# Patient Record
Sex: FEMALE | Race: WHITE | NOT HISPANIC OR LATINO | Employment: FULL TIME | URBAN - NONMETROPOLITAN AREA
[De-identification: names, ages, dates, MRNs, and addresses within clinical notes are randomized per-mention and may not be internally consistent; named-entity substitution may affect disease eponyms.]

---

## 2018-03-28 ENCOUNTER — TRANSCRIBE ORDERS (OUTPATIENT)
Dept: ADMINISTRATIVE | Facility: HOSPITAL | Age: 53
End: 2018-03-28

## 2018-03-28 ENCOUNTER — APPOINTMENT (OUTPATIENT)
Dept: LAB | Facility: HOSPITAL | Age: 53
End: 2018-03-28

## 2018-03-28 DIAGNOSIS — N76.0 BACTERIAL VAGINOSIS: Primary | ICD-10-CM

## 2018-03-28 DIAGNOSIS — B96.89 BACTERIAL VAGINOSIS: Primary | ICD-10-CM

## 2018-03-28 PROCEDURE — 87086 URINE CULTURE/COLONY COUNT: CPT | Performed by: NURSE PRACTITIONER

## 2018-03-28 PROCEDURE — 87186 SC STD MICRODIL/AGAR DIL: CPT | Performed by: NURSE PRACTITIONER

## 2018-03-28 PROCEDURE — 87077 CULTURE AEROBIC IDENTIFY: CPT | Performed by: NURSE PRACTITIONER

## 2018-03-30 LAB
BACTERIA SPEC AEROBE CULT: ABNORMAL
BACTERIA SPEC AEROBE CULT: ABNORMAL

## 2021-10-19 ENCOUNTER — TRANSCRIBE ORDERS (OUTPATIENT)
Dept: ADMINISTRATIVE | Facility: HOSPITAL | Age: 56
End: 2021-10-19

## 2021-10-19 DIAGNOSIS — Z12.31 ENCOUNTER FOR SCREENING MAMMOGRAM FOR MALIGNANT NEOPLASM OF BREAST: Primary | ICD-10-CM

## 2021-11-05 ENCOUNTER — OFFICE VISIT (OUTPATIENT)
Dept: NEUROSURGERY | Facility: CLINIC | Age: 56
End: 2021-11-05

## 2021-11-05 VITALS
HEIGHT: 68 IN | WEIGHT: 196 LBS | SYSTOLIC BLOOD PRESSURE: 120 MMHG | DIASTOLIC BLOOD PRESSURE: 86 MMHG | TEMPERATURE: 97.3 F | BODY MASS INDEX: 29.7 KG/M2

## 2021-11-05 DIAGNOSIS — D32.9 MENINGIOMA (HCC): Primary | ICD-10-CM

## 2021-11-05 DIAGNOSIS — Z00.6 EXAM FOR CLINICAL RESEARCH: ICD-10-CM

## 2021-11-05 PROCEDURE — 99204 OFFICE O/P NEW MOD 45 MIN: CPT | Performed by: PHYSICIAN ASSISTANT

## 2021-11-05 RX ORDER — ACYCLOVIR 400 MG/1
400 TABLET ORAL
COMMUNITY

## 2021-11-05 RX ORDER — PROGESTERONE 200 MG/1
400 CAPSULE ORAL DAILY
COMMUNITY

## 2021-11-05 RX ORDER — ALBUTEROL SULFATE 90 UG/1
AEROSOL, METERED RESPIRATORY (INHALATION)
COMMUNITY
Start: 2021-10-18

## 2021-11-05 NOTE — PROGRESS NOTES
Patient: Gavi Arreguin  : 1965  Chart #: [id number]    Date of Service: 2021    CHIEF COMPLAINT: Headache    History of Present Illness Ms. Arreguin is a very pleasant 56-year-old  who is new to our clinic.  She describes an 8-9-month history of left-sided headache-worse with coughing, sneezing, and lying on the left side.  Headaches are daily, generally worse in the morning, and improved with Tylenol.  Around the time she developed headache she also developed some peripheral vision changes on the left.  She is also had some issue with tremor in the hands.  About a month ago she was diagnosed with Covid and seen in the emergency department.  Given her ongoing headache a CT of the brain was ordered.  She was found to have an incidental calcified meningioma.  She denies focal weakness or nausea/vomiting.      Past Medical History:   Diagnosis Date   • COVID-19    • Headache    • Low back pain          Current Outpatient Medications:   •  acyclovir (ZOVIRAX) 400 MG tablet, Take 400 mg by mouth Every 4 (Four) Hours While Awake. Take no more than 5 doses a day., Disp: , Rfl:   •  albuterol sulfate  (90 Base) MCG/ACT inhaler, , Disp: , Rfl:   •  Ascorbic Acid (VITAMIN C PO), Take 1,000 mg by mouth Daily., Disp: , Rfl:   •  Progesterone (PROMETRIUM) 200 MG capsule, Take 400 mg by mouth Daily., Disp: , Rfl:   •  TESTOSTERONE CYPIONATE IM, Inject 0.1 mL into the appropriate muscle as directed by prescriber 1 (One) Time Per Week., Disp: , Rfl:     No past surgical history on file.    Social History     Socioeconomic History   • Marital status: Single   Tobacco Use   • Smoking status: Never Smoker   • Smokeless tobacco: Never Used   Substance and Sexual Activity   • Alcohol use: Yes   • Drug use: Yes     Types: Marijuana   • Sexual activity: Defer         Review of Systems   Constitutional: Positive for fatigue. Negative for activity change, appetite change, chills, diaphoresis, fever  and unexpected weight change.   HENT: Positive for hearing loss. Negative for congestion, dental problem, drooling, ear discharge, ear pain, facial swelling, mouth sores, nosebleeds, postnasal drip, rhinorrhea, sinus pressure, sneezing, sore throat, tinnitus, trouble swallowing and voice change.    Eyes: Positive for visual disturbance. Negative for photophobia, pain, discharge, redness and itching.   Respiratory: Negative for apnea, cough, choking, chest tightness, shortness of breath, wheezing and stridor.    Cardiovascular: Negative for chest pain, palpitations and leg swelling.   Gastrointestinal: Negative for abdominal distention, abdominal pain, anal bleeding, blood in stool, constipation, diarrhea, nausea, rectal pain and vomiting.   Endocrine: Negative for cold intolerance, heat intolerance, polydipsia, polyphagia and polyuria.   Genitourinary: Negative for decreased urine volume, difficulty urinating, dysuria, enuresis, flank pain, frequency, genital sores, hematuria and urgency.   Musculoskeletal: Negative for arthralgias, back pain, gait problem, joint swelling, myalgias, neck pain and neck stiffness.   Skin: Negative for color change, pallor, rash and wound.   Allergic/Immunologic: Negative for environmental allergies, food allergies and immunocompromised state.   Neurological: Positive for weakness, numbness and headaches. Negative for dizziness, tremors, seizures, syncope, facial asymmetry, speech difficulty and light-headedness.   Hematological: Negative for adenopathy. Does not bruise/bleed easily.   Psychiatric/Behavioral: Negative for agitation, behavioral problems, confusion, decreased concentration, dysphoric mood, hallucinations, self-injury, sleep disturbance and suicidal ideas. The patient is not nervous/anxious and is not hyperactive.    All other systems reviewed and are negative.      Objective   Vital Signs: Blood pressure 120/86, temperature 97.3 °F (36.3 °C), temperature source Infrared,  "height 172.7 cm (68\"), weight 88.9 kg (196 lb).  Physical Exam  Vitals and nursing note reviewed.   Constitutional:       General: She is not in acute distress.     Appearance: She is well-developed.   HENT:      Head: Normocephalic and atraumatic.   Eyes:      Pupils: Pupils are equal, round, and reactive to light.   Cardiovascular:      Heart sounds: Normal heart sounds.   Pulmonary:      Breath sounds: Normal breath sounds.   Psychiatric:         Behavior: Behavior normal.         Thought Content: Thought content normal.     Musculoskeletal:     Strength is intact in upper and lower extremities to direct testing.     Station and gait are normal.     Straight leg raising is negative.   Neurologic:     Muscle tone is normal throughout.     Coordination is intact.     Deep tendon reflexes: 2+ and symmetrical.     Sensation is intact to light touch throughout.     Patient is oriented to person, place, and time.  CRANIAL NERVES:  Cranial nerve II:  Visual acuity intact.  Visual fields are intact.  Cranial nerves III, IV, and VI: Extraocular movements are intact.  There is some nystagmus on leftward gaze.  Pupils are equal, round, and reactive to light and accommodation.  The eyelids elevate normal equal bilaterally.  Cranial nerve V: Jaw clenching is intact bilaterally.  Facial sensation is intact to light touch.   Cranial nerve VII: There is no facial asymmetry to direct testing of the muscles of facial expression.  Cranial nerve VIII: Hearing intact to finger rub bilaterally.  Balance is intact.  Cranial nerve IX and X: Uvula is midline.  The soft palate rises symmetrically.  Voice is intact without hoarseness.   Cranial nerve XI: Shoulder shrug is intact.  Cranial nerve XII: Tongue is midline without atrophy or fasciculation.       Independent review of radiographic imaging: CT of the brain demonstrates a calcified meningoma noted anteriorly to the left of midline. No other acute findings.     Assessment/Plan "   Diagnosis:   1.  Calcified cerebral meningioma, likely asymptomatic  2.  Headache     Medical Decision Making: I reviewed imaging with Dr. Leonardo.  Given patient's recent onslaught of new symptoms which are unlikely to be related to the calcified meningioma demonstrated on CT scan, he would like to get an MRI of the brain to make sure we are not missing something.  If all is normal there, then we will follow-up in a couple years with a new scan.    Diagnoses and all orders for this visit:    1. Meningioma (HCC) (Primary)  -     MRI Brain With & Without Contrast; Future    2. Exam for clinical research  -     CT outside films; Future  -     CT outside films; Future  -     MRI Brain With & Without Contrast; Future                        Patient's Body mass index is 29.8 kg/m². indicating that she is overweight (BMI 25-29.9). Obesity-related health conditions include the following: none. Obesity is unchanged. BMI is is above average; BMI management plan is completed. We discussed portion control and increasing exercise..         Lynnette Chisholm PA-C  Patient Care Team:  Kristie Zelaya APRN as PCP - General (Nurse Practitioner)  Kristie Zelaya APRN as Referring Physician (Nurse Practitioner)

## 2021-12-08 ENCOUNTER — HOSPITAL ENCOUNTER (OUTPATIENT)
Dept: MRI IMAGING | Facility: HOSPITAL | Age: 56
Discharge: HOME | End: 2021-12-08
Admitting: PHYSICIAN ASSISTANT

## 2021-12-08 DIAGNOSIS — D32.9 MENINGIOMA (HCC): ICD-10-CM

## 2021-12-08 DIAGNOSIS — Z00.6 EXAM FOR CLINICAL RESEARCH: ICD-10-CM

## 2021-12-08 PROCEDURE — 70553 MRI BRAIN STEM W/O & W/DYE: CPT

## 2021-12-08 PROCEDURE — A9577 INJ MULTIHANCE: HCPCS | Performed by: PHYSICIAN ASSISTANT

## 2021-12-08 PROCEDURE — 0 GADOBENATE DIMEGLUMINE 529 MG/ML SOLUTION: Performed by: PHYSICIAN ASSISTANT

## 2021-12-08 RX ADMIN — GADOBENATE DIMEGLUMINE 18 ML: 529 INJECTION, SOLUTION INTRAVENOUS at 16:25

## 2021-12-10 ENCOUNTER — HOSPITAL ENCOUNTER (OUTPATIENT)
Dept: MAMMOGRAPHY | Facility: HOSPITAL | Age: 56
Discharge: HOME | End: 2021-12-10
Admitting: NURSE PRACTITIONER

## 2021-12-10 DIAGNOSIS — Z12.31 ENCOUNTER FOR SCREENING MAMMOGRAM FOR MALIGNANT NEOPLASM OF BREAST: ICD-10-CM

## 2021-12-10 PROCEDURE — 77067 SCR MAMMO BI INCL CAD: CPT

## 2021-12-10 PROCEDURE — 77063 BREAST TOMOSYNTHESIS BI: CPT

## 2021-12-15 ENCOUNTER — OFFICE VISIT (OUTPATIENT)
Dept: NEUROSURGERY | Facility: CLINIC | Age: 56
End: 2021-12-15

## 2021-12-15 VITALS
BODY MASS INDEX: 29.95 KG/M2 | TEMPERATURE: 98 F | SYSTOLIC BLOOD PRESSURE: 122 MMHG | WEIGHT: 197.6 LBS | HEIGHT: 68 IN | DIASTOLIC BLOOD PRESSURE: 82 MMHG

## 2021-12-15 DIAGNOSIS — D32.9 MENINGIOMA (HCC): Primary | ICD-10-CM

## 2021-12-15 PROCEDURE — 99213 OFFICE O/P EST LOW 20 MIN: CPT | Performed by: PHYSICIAN ASSISTANT

## 2021-12-15 RX ORDER — ESTRADIOL 0.5 MG/1
TABLET ORAL
COMMUNITY
Start: 2021-11-15

## 2021-12-15 NOTE — PROGRESS NOTES
Patient: Gavi Arreguin  : 1965  Chart #: [id number]    Date of Service: 12/15/2021    CHIEF COMPLAINT: Headache    History of Present Illness Ms. Arreguin is seen in follow-up.  She is a very pleasant 56-year-old  who is new to our clinic.  She describes an 8-9-month history of left-sided headache-worse with coughing, sneezing, and lying on the left side.  Headaches are daily, generally worse in the morning, and improved with Tylenol.  Around the time she developed headache she also developed some peripheral vision changes on the left.  She is also had some issue with tremor in the hands.  About a month ago she was diagnosed with Covid and seen in the emergency department.  Given her ongoing headache a CT of the brain was ordered.  She was found to have an incidental calcified meningioma that was reviewed at last visit.  Dr. Leonardo recommended follow-up with an MRI with and without gadolinium.  She is here today to review.  She denies focal weakness or nausea/vomiting.  No new symptoms      Past Medical History:   Diagnosis Date   • COVID-19    • Headache    • Low back pain          Current Outpatient Medications:   •  acyclovir (ZOVIRAX) 400 MG tablet, Take 400 mg by mouth Every 4 (Four) Hours While Awake. Take no more than 5 doses a day., Disp: , Rfl:   •  albuterol sulfate  (90 Base) MCG/ACT inhaler, , Disp: , Rfl:   •  Ascorbic Acid (VITAMIN C PO), Take 1,000 mg by mouth Daily., Disp: , Rfl:   •  estradiol (ESTRACE) 0.5 MG tablet, , Disp: , Rfl:   •  Progesterone (PROMETRIUM) 200 MG capsule, Take 400 mg by mouth Daily., Disp: , Rfl:   •  TESTOSTERONE CYPIONATE IM, Inject 0.1 mL into the appropriate muscle as directed by prescriber 1 (One) Time Per Week., Disp: , Rfl:     No past surgical history on file.    Social History     Socioeconomic History   • Marital status: Single   Tobacco Use   • Smoking status: Never Smoker   • Smokeless tobacco: Never Used   Substance and Sexual  Activity   • Alcohol use: Yes   • Drug use: Yes     Types: Marijuana   • Sexual activity: Defer         Review of Systems   Constitutional: Positive for fatigue. Negative for activity change, appetite change, chills, diaphoresis, fever and unexpected weight change.   HENT: Positive for hearing loss. Negative for congestion, dental problem, drooling, ear discharge, ear pain, facial swelling, mouth sores, nosebleeds, postnasal drip, rhinorrhea, sinus pressure, sneezing, sore throat, tinnitus, trouble swallowing and voice change.    Eyes: Positive for visual disturbance. Negative for photophobia, pain, discharge, redness and itching.   Respiratory: Negative for apnea, cough, choking, chest tightness, shortness of breath, wheezing and stridor.    Cardiovascular: Negative for chest pain, palpitations and leg swelling.   Gastrointestinal: Negative for abdominal distention, abdominal pain, anal bleeding, blood in stool, constipation, diarrhea, nausea, rectal pain and vomiting.   Endocrine: Negative for cold intolerance, heat intolerance, polydipsia, polyphagia and polyuria.   Genitourinary: Negative for decreased urine volume, difficulty urinating, dysuria, enuresis, flank pain, frequency, genital sores, hematuria and urgency.   Musculoskeletal: Negative for arthralgias, back pain, gait problem, joint swelling, myalgias, neck pain and neck stiffness.   Skin: Negative for color change, pallor, rash and wound.   Allergic/Immunologic: Negative for environmental allergies, food allergies and immunocompromised state.   Neurological: Positive for weakness, numbness and headaches. Negative for dizziness, tremors, seizures, syncope, facial asymmetry, speech difficulty and light-headedness.   Hematological: Negative for adenopathy. Does not bruise/bleed easily.   Psychiatric/Behavioral: Negative for agitation, behavioral problems, confusion, decreased concentration, dysphoric mood, hallucinations, self-injury, sleep disturbance and  "suicidal ideas. The patient is not nervous/anxious and is not hyperactive.    All other systems reviewed and are negative.      Objective   Vital Signs: Blood pressure 122/82, temperature 98 °F (36.7 °C), temperature source Infrared, height 172.7 cm (68\"), weight 89.6 kg (197 lb 9.6 oz).  Physical Exam  Vitals and nursing note reviewed.   Constitutional:       General: She is not in acute distress.     Appearance: She is well-developed.   HENT:      Head: Normocephalic and atraumatic.   Eyes:      Pupils: Pupils are equal, round, and reactive to light.   Cardiovascular:      Heart sounds: Normal heart sounds.   Pulmonary:      Breath sounds: Normal breath sounds.   Psychiatric:         Behavior: Behavior normal.         Thought Content: Thought content normal.     Musculoskeletal:     Strength is intact in upper and lower extremities to direct testing.     Station and gait are normal.     Straight leg raising is negative.   Neurologic:     Muscle tone is normal throughout.     Coordination is intact.     Deep tendon reflexes: 2+ and symmetrical.     Sensation is intact to light touch throughout.     Patient is oriented to person, place, and time.  CRANIAL NERVES:  Cranial nerve II:  Visual acuity intact.  Visual fields are intact.  Cranial nerves III, IV, and VI: Extraocular movements are intact.  There is some nystagmus on leftward gaze.  Pupils are equal, round, and reactive to light and accommodation.  The eyelids elevate normal equal bilaterally.  Cranial nerve V: Jaw clenching is intact bilaterally.  Facial sensation is intact to light touch.   Cranial nerve VII: There is no facial asymmetry to direct testing of the muscles of facial expression.  Cranial nerve VIII: Hearing intact to finger rub bilaterally.  Balance is intact.  Cranial nerve IX and X: Uvula is midline.  The soft palate rises symmetrically.  Voice is intact without hoarseness.   Cranial nerve XI: Shoulder shrug is intact.  Cranial nerve XII: " Tongue is midline without atrophy or fasciculation.       Independent review of radiographic imaging: CT of the brain demonstrates a calcified meningoma noted anteriorly to the left of midline. No other acute findings.     MRI of the brain dated 12/9/2021 demonstrates 1.5 cm extra-axial homogenously enhancing mass in the left vertex without significant surrounding edema or mass-effect.  Consistent with previously noted calcified meningioma.  MRI was also reviewed by Dr. Leonardo    Assessment/Plan   Diagnosis:   1.  Calcified cerebral meningioma, likely asymptomatic  2.  Headache     Medical Decision Making: Patient will follow up in 1 year with an MRI of the brain with and without gadolinium.  Call our office in the interim with any questions or concerns      Diagnoses and all orders for this visit:    1. Meningioma (HCC) (Primary)  -     MRI Brain With & Without Contrast; Future                        Patient's Body mass index is 30.04 kg/m². indicating that she is overweight (BMI 25-29.9). Obesity-related health conditions include the following: none. Obesity is unchanged. BMI is is above average; BMI management plan is completed. We discussed portion control and increasing exercise..         Lynnette Chisholm PA-C  Patient Care Team:  Kristie Zelaya APRN as PCP - General (Nurse Practitioner)  Kristie Zelaya APRN as Referring Physician (Nurse Practitioner)

## 2021-12-21 ENCOUNTER — TRANSCRIBE ORDERS (OUTPATIENT)
Dept: ADMINISTRATIVE | Facility: HOSPITAL | Age: 56
End: 2021-12-21

## 2021-12-21 DIAGNOSIS — R92.8 ABNORMAL MAMMOGRAM: Primary | ICD-10-CM

## 2021-12-23 ENCOUNTER — TRANSCRIBE ORDERS (OUTPATIENT)
Dept: ADMINISTRATIVE | Facility: HOSPITAL | Age: 56
End: 2021-12-23

## 2021-12-23 DIAGNOSIS — R92.8 ABNORMAL MAMMOGRAM: Primary | ICD-10-CM

## 2022-01-28 ENCOUNTER — HOSPITAL ENCOUNTER (OUTPATIENT)
Dept: ULTRASOUND IMAGING | Facility: HOSPITAL | Age: 57
Discharge: HOME | End: 2022-01-28

## 2022-01-28 ENCOUNTER — HOSPITAL ENCOUNTER (OUTPATIENT)
Dept: MAMMOGRAPHY | Facility: HOSPITAL | Age: 57
Discharge: HOME | End: 2022-01-28

## 2022-01-28 DIAGNOSIS — R92.8 ABNORMAL MAMMOGRAM: ICD-10-CM

## 2022-01-28 PROCEDURE — G0279 TOMOSYNTHESIS, MAMMO: HCPCS

## 2022-01-28 PROCEDURE — 76642 ULTRASOUND BREAST LIMITED: CPT

## 2022-01-28 PROCEDURE — 77065 DX MAMMO INCL CAD UNI: CPT

## 2022-02-11 ENCOUNTER — TRANSCRIBE ORDERS (OUTPATIENT)
Dept: ADMINISTRATIVE | Facility: HOSPITAL | Age: 57
End: 2022-02-11

## 2022-02-11 DIAGNOSIS — R92.8 ABNORMAL MAMMOGRAM OF RIGHT BREAST: Primary | ICD-10-CM

## 2022-06-27 ENCOUNTER — HOSPITAL ENCOUNTER (OUTPATIENT)
Dept: MAMMOGRAPHY | Facility: HOSPITAL | Age: 57
Discharge: HOME | End: 2022-06-27

## 2022-06-27 ENCOUNTER — HOSPITAL ENCOUNTER (OUTPATIENT)
Dept: ULTRASOUND IMAGING | Facility: HOSPITAL | Age: 57
Discharge: HOME | End: 2022-06-27

## 2022-06-27 DIAGNOSIS — R92.8 ABNORMAL MAMMOGRAM OF RIGHT BREAST: ICD-10-CM

## 2022-06-27 PROCEDURE — 76641 ULTRASOUND BREAST COMPLETE: CPT

## 2022-06-27 PROCEDURE — G0279 TOMOSYNTHESIS, MAMMO: HCPCS

## 2022-06-27 PROCEDURE — 77065 DX MAMMO INCL CAD UNI: CPT

## 2023-05-24 ENCOUNTER — HOSPITAL ENCOUNTER (OUTPATIENT)
Dept: GENERAL RADIOLOGY | Facility: HOSPITAL | Age: 58
Discharge: HOME | End: 2023-05-24
Admitting: NURSE PRACTITIONER
Payer: COMMERCIAL

## 2023-05-24 ENCOUNTER — TRANSCRIBE ORDERS (OUTPATIENT)
Dept: GENERAL RADIOLOGY | Facility: HOSPITAL | Age: 58
End: 2023-05-24
Payer: COMMERCIAL

## 2023-05-24 DIAGNOSIS — M79.674 PAIN IN TOE OF RIGHT FOOT: Primary | ICD-10-CM

## 2023-05-24 DIAGNOSIS — M79.674 PAIN IN TOE OF RIGHT FOOT: ICD-10-CM

## 2023-05-24 PROCEDURE — 73660 X-RAY EXAM OF TOE(S): CPT

## 2023-11-30 ENCOUNTER — TELEPHONE (OUTPATIENT)
Dept: NEUROSURGERY | Facility: CLINIC | Age: 58
End: 2023-11-30
Payer: COMMERCIAL

## 2023-11-30 DIAGNOSIS — D32.9 MENINGIOMA: Primary | ICD-10-CM

## 2023-11-30 NOTE — TELEPHONE ENCOUNTER
"----- Message from Aly Coats MA sent at 11/29/2023 12:06 PM EST -----  Regarding: FW: Appointment Request  Contact: [phone]    ----- Message -----  From: Rosa Elaine  Sent: 11/28/2023   5:03 PM EST  To: [facility]  Subject: FW: Appointment Request                          Should patient be scheduled for an MRI or CT prior to appt?      Per last office note, \" I reviewed imaging with Dr. Leonardo.  Given patient's recent onslaught of new symptoms which are unlikely to be related to the calcified meningioma demonstrated on CT scan, he would like to get an MRI of the brain to make sure we are not missing something.  If all is normal there, then we will follow-up in a couple years with a new scan.\"    Please review and advise.      Thank you.       ----- Message -----  From: Gavi Arreguin \"Alisa\"  Sent: 11/27/2023   9:22 AM EST  To: [facility]  Subject: Appointment Request                              Appointment Request From: Gavi Arreguin    With Provider: Lynnette Chisholm [[facility] NEUROSURGERY]    Preferred Date Range: 12/8/2023 – 12/15/2023    Preferred Times: Any Time    Reason for visit: Follow-up    Comments:  if I need to repeat MRI.  still having vision issues, still having headaches, also some numbness on the left side of face/head.       "

## 2023-11-30 NOTE — TELEPHONE ENCOUNTER
"Per Apro's last note on 12/15/2021    \"Patient will follow up in 1 year with an MRI of the brain with and without gadolinium.  Call our office in the interim with any questions or concerns\"      Ok to schedule MRI and f/u with A[facility]. Order placed.    "

## 2024-01-09 ENCOUNTER — HOSPITAL ENCOUNTER (OUTPATIENT)
Dept: MRI IMAGING | Facility: HOSPITAL | Age: 59
Discharge: HOME | End: 2024-01-09
Admitting: PHYSICIAN ASSISTANT
Payer: COMMERCIAL

## 2024-01-09 DIAGNOSIS — D32.9 MENINGIOMA: ICD-10-CM

## 2024-01-09 PROCEDURE — A9577 INJ MULTIHANCE: HCPCS | Performed by: PHYSICIAN ASSISTANT

## 2024-01-09 PROCEDURE — 70553 MRI BRAIN STEM W/O & W/DYE: CPT

## 2024-01-09 PROCEDURE — 0 GADOBENATE DIMEGLUMINE 529 MG/ML SOLUTION: Performed by: PHYSICIAN ASSISTANT

## 2024-01-09 RX ADMIN — GADOBENATE DIMEGLUMINE 19 ML: 529 INJECTION, SOLUTION INTRAVENOUS at 14:23

## 2024-01-10 ENCOUNTER — OFFICE VISIT (OUTPATIENT)
Dept: NEUROSURGERY | Facility: CLINIC | Age: 59
End: 2024-01-10
Payer: COMMERCIAL

## 2024-01-10 VITALS — WEIGHT: 216.8 LBS | BODY MASS INDEX: 32.86 KG/M2 | TEMPERATURE: 97.7 F | HEIGHT: 68 IN | RESPIRATION RATE: 17 BRPM

## 2024-01-10 DIAGNOSIS — D32.9 MENINGIOMA: Primary | ICD-10-CM

## 2024-01-10 PROCEDURE — 99214 OFFICE O/P EST MOD 30 MIN: CPT | Performed by: NEUROLOGICAL SURGERY

## 2024-01-10 RX ORDER — AMOXICILLIN AND CLAVULANATE POTASSIUM 875; 125 MG/1; MG/1
TABLET, FILM COATED ORAL
COMMUNITY
Start: 2024-01-04

## 2024-01-10 NOTE — PROGRESS NOTES
Patient: Gavi Arreguin  : 1965    Primary Care Provider: Kristie Zelaya APRN    Requesting Provider: As above        History    Chief Complaint:.  Headache.    History of Present Illness: Ms. Arreguin is a 58-year-old woman who has been seen in our clinic on a couple of occasions.  Given the above difficulties she underwent imaging and imaging demonstrated a small parasagittal left frontal lesion suspicious for calcified meningioma.  She continues to have some low-grade left-sided headache.  She has a bit of an odd feeling in her face on the left side.  She has previously had Bell's palsy on that side.  She describes a bit of a visual shadow on the left.  Ophthalmologic evaluation has been unremarkable by her report.    Review of Systems   Constitutional:  Negative for activity change, appetite change, chills, diaphoresis, fatigue, fever and unexpected weight change.   HENT:  Negative for congestion, dental problem, drooling, ear discharge, ear pain, facial swelling, hearing loss, mouth sores, nosebleeds, postnasal drip, rhinorrhea, sinus pressure, sneezing, sore throat, tinnitus, trouble swallowing and voice change.    Eyes:  Negative for photophobia, pain, discharge, redness, itching and visual disturbance.   Respiratory:  Negative for apnea, cough, choking, chest tightness, shortness of breath, wheezing and stridor.    Cardiovascular:  Negative for chest pain, palpitations and leg swelling.   Gastrointestinal:  Negative for abdominal distention, abdominal pain, anal bleeding, blood in stool, constipation, diarrhea, nausea, rectal pain and vomiting.   Endocrine: Negative for cold intolerance, heat intolerance, polydipsia, polyphagia and polyuria.   Genitourinary:  Negative for decreased urine volume, difficulty urinating, dysuria, enuresis, flank pain, frequency, genital sores, hematuria and urgency.   Musculoskeletal:  Negative for arthralgias, back pain, gait problem, joint swelling, myalgias,  "neck pain and neck stiffness.   Skin:  Negative for color change, pallor, rash and wound.   Allergic/Immunologic: Negative for environmental allergies, food allergies and immunocompromised state.   Neurological:  Positive for headaches. Negative for dizziness, tremors, seizures, syncope, facial asymmetry, speech difficulty, weakness, light-headedness and numbness.   Hematological:  Negative for adenopathy. Does not bruise/bleed easily.   Psychiatric/Behavioral:  Negative for agitation, behavioral problems, confusion, decreased concentration, dysphoric mood, hallucinations, self-injury, sleep disturbance and suicidal ideas. The patient is not nervous/anxious and is not hyperactive.    All other systems reviewed and are negative.      The patient's past medical history, past surgical history, family history, and social history have been reviewed at length in the electronic medical record.      Physical Exam:   Temp 97.7 °F (36.5 °C) (Infrared)   Resp 17   Ht 172.7 cm (68\")   Wt 98.3 kg (216 lb 12.8 oz)   BMI 32.96 kg/m²   Visual fields are full to confrontation.  Extraocular movements are intact.  Facial symmetry is preserved.  Her gait is normal.  There is no pronator drift.    Medical Decision Making    Data Review:   (All imaging studies were personally reviewed unless stated otherwise)  MRI of the brain dated 1/9/2024 was compared to the prior study from 12/8/2021.  The subtle rounded lesion adjacent to the midline in the mid left frontal region is once again observed.  There is no change in the studies.    Diagnosis:   Stable parasagittal left frontal meningioma.    Treatment Options:   The patient will follow-up in 2.5 years with a new MRI of the brain with and without gadolinium.      Scribed for Rene Leonardo MD by Zabrina Fernandez CMA on 1/10/2024 10:22 EST       I, Dr. Leonardo, personally performed the services described in the documentation, as scribed in my presence, and it is both accurate and " complete.